# Patient Record
Sex: FEMALE | Race: WHITE | NOT HISPANIC OR LATINO | Employment: OTHER | URBAN - METROPOLITAN AREA
[De-identification: names, ages, dates, MRNs, and addresses within clinical notes are randomized per-mention and may not be internally consistent; named-entity substitution may affect disease eponyms.]

---

## 2022-05-24 ENCOUNTER — TELEPHONE (OUTPATIENT)
Dept: NEPHROLOGY | Facility: CLINIC | Age: 52
End: 2022-05-24

## 2022-05-24 NOTE — TELEPHONE ENCOUNTER
Patient was contacted and was told we unfortunately do not have a provider in the office today. she is having blood in her urine and was advised to contact her urologist and we will see her Wednesday.     ----- Message from Lana Farley sent at 5/24/2022  7:52 AM CDT -----  Regarding: Same Day Appointment  Type:  Same Day Appointment Request        Name of Caller:Patient has appointment scheduled for tomorrow.   Patient states experiencing a problem would like to be seen today If Possible.   Patient states can see any doctor  will give more detail.  When is the first available appointment?  Symptoms:  Best Call Back Number:[phone]  Additional Information:

## 2022-05-25 ENCOUNTER — OFFICE VISIT (OUTPATIENT)
Dept: NEPHROLOGY | Facility: CLINIC | Age: 52
End: 2022-05-25

## 2022-05-25 VITALS
WEIGHT: 239 LBS | OXYGEN SATURATION: 97 % | HEIGHT: 68 IN | HEART RATE: 99 BPM | BODY MASS INDEX: 36.22 KG/M2 | SYSTOLIC BLOOD PRESSURE: 136 MMHG | DIASTOLIC BLOOD PRESSURE: 100 MMHG | TEMPERATURE: 97 F

## 2022-05-25 DIAGNOSIS — N18.31 STAGE 3A CHRONIC KIDNEY DISEASE: Primary | ICD-10-CM

## 2022-05-25 DIAGNOSIS — I10 PRIMARY HYPERTENSION: ICD-10-CM

## 2022-05-25 DIAGNOSIS — Z90.5 H/O RIGHT NEPHRECTOMY: ICD-10-CM

## 2022-05-25 PROCEDURE — 99213 OFFICE O/P EST LOW 20 MIN: CPT | Mod: ,,, | Performed by: INTERNAL MEDICINE

## 2022-05-25 PROCEDURE — 99213 PR OFFICE/OUTPT VISIT, EST, LEVL III, 20-29 MIN: ICD-10-PCS | Mod: ,,, | Performed by: INTERNAL MEDICINE

## 2022-05-25 RX ORDER — LEVOTHYROXINE SODIUM 75 UG/1
75 TABLET ORAL DAILY
COMMUNITY
Start: 2022-04-14

## 2022-05-25 NOTE — PROGRESS NOTES
Nephrology Clinic Note           Pt Name:  Asiya Reed  Pt :  1970  Pt MRN:  [id number]    Date: 2022  Provider: Jerry Alaniz      Chief Complaint:   Chief Complaint   Patient presents with    Chronic Kidney Disease     Serum creatinine 0.98, GFR 60, CKD stage 3       HPI:  Asiya Reed is a 51 y.o. female presenting for chronic kidney disease stage III, status post right kidney nephrectomy.  History is significant for hypertension, diabetes, status post right kidney nephrectomy for hydronephrosis, status post radiation therapy for cervical cancer, urinary incontinence.  The patient is coming here for the 3rd time. She was referred from primary doctor for abnormal renal function.  Most likely she has had abnormal renal function at least -since 2018.   she had a nephrectomy of her right kidney.    Since her last visit - looks her BP with better control, taking amlodipine at present. Dm under control off of medications   Reports good urine output, has urinary incontinence, denies any dysuria or hematuria. Trying to see an urologist but does not have an insuarnce  Today in the office, no shortness of breath, no chest pain, no fever, no dysuria, no hematuria, no swelling of her legs, no diarrhea, or constipation.         Review of Systems   Gen: no fever, chills, fatigue, weight loss/gain  HEENT: no vision changes, no hearing deficits, no nasal discharge  Respiratory: no cough, dyspnea  CVS: no chest pain, PND, orthopnea  Abd: no nausea, vomiting, abdominal pain, diarrhea, constipation  : no hematuria, dysuria, urinary retention  Ext: no edema, joint and muscle pains  Neuro: no weakness, no numbness  Skin: no rashes, no lesions  Psych: no depression, no anxiety    History:   Past Medical History:   Diagnosis Date    Bipolar 1 disorder     Bladder stone     Cervical cancer     Essential (primary) hypertension     Gallstone     History of thyroid disease     Hydronephrosis      "Hypercholesteremia     Iron deficiency anemia, unspecified     Nocturia     Renal disorder     renal stints    Retained ureteral stent     Type 2 diabetes mellitus without complications     Unspecified chronic bronchitis     Unspecified urinary incontinence      Past Surgical History:   Procedure Laterality Date     renal stint       unblock Kidney      ABDOMINAL SURGERY      TOOK OUT REMAINING PIECE OF OVARY AND FALLOPIAN TUBE AND CLEAN OUT INFECTION    CERVIX SURGERY      Twice     SECTION      CHOLECYSTECTOMY  2014    HYSTERECTOMY      NEPHROSTOMY Right     PYELOPLASTY Right 2013     Family History   Problem Relation Age of Onset    Hypertension Mother     Liver disease Mother     Hypertension Father     Heart disease Father     Heart disease Maternal Grandmother      Social History     Substance and Sexual Activity   Alcohol Use Not on file     Social History     Substance and Sexual Activity   Drug Use Never     Social History     Substance and Sexual Activity   Sexual Activity Not on file     has no history on file for sexual activity.  Social History     Tobacco Use   Smoking Status Current Some Day Smoker    Types: Cigarettes   Smokeless Tobacco Never Used       Allergies:  Review of patient's allergies indicates:  No Known Allergies    [unfilled]       Physical Exam  Vitals:   Vitals:    22 1421   BP: (!) 163/101   Pulse: 99   Temp: 97.3 °F (36.3 °C)   SpO2: 97%   Weight: 108.4 kg (239 lb)   Height: 5' 8" (1.727 m)     Body mass index is 36.34 kg/m².    Vital signs:   Wt Readings from Last 3 Encounters:   22 108.4 kg (239 lb)     Temp Readings from Last 3 Encounters:   22 97.3 °F (36.3 °C)     BP Readings from Last 3 Encounters:   22 (!) 163/101     Pulse Readings from Last 3 Encounters:   22 99       @FLOWSTAT(5:24::1)@    GENERAL ASSESSMENT: awake and alert in no acute distress.  Eyes: anicteric sclera, no erythema or discharge.  HENT: " "Normocephalic, atraumatic, moist mucus membranes  Neck: Supple, no thyromegaly or lymphadenopathy   SKIN EXAM: no rash, ecchymosis.  Cardiovascular: regular rate and rhythm, S1 S2 heard.  No murmurs, rubs or gallops.  Respiratory: No rales, no wheezes or rhonchi  GI: BS+, NT, ND, no organomegaly.  : No chaudhari   MSK: No edema.  No joint stiffness, effusions  NEURO: alert and oriented,  PSYCH: answers questions appropriately, organized thinking   Nails: No clubbing, no pallor       Labs/Tests:  @2YRLABRCNT(Na:3,K:3,CL:3,CO2:3,BUN:3,CREATININE:3,GLUCOSE:3,CALCIUM:3,PHOS:3,ALBUMIN:3,ANIONGAP:3,EGFRNONAA:3,EGFRAA:3,HGB:3,HGBA1C:3,TRANSFERRIN:3,IRON:3,TIBC:3,LABIRON:3,FERRITIN:3,TRIG:3,CHOL:3,HDL:3,LDLCALC:3,LABVLDL:3,NHDLCHOL:3)@   @2YRLABRCNT(pthi:*,paxasknk94cw:*)@  @2YRLABRCNT(PROCREA:3,PROTUR:3,CREATININEUR:3)@      @2YRLABRCNT(COLORUR:*,CLARITYUR:*,SPECGRAVUR:*,PHUR:*,PROTUR:*,BILIRUBINUR:*,BLOODUR:*,KETONESUR:*,UROB:*,NITRITEUR:*,LEUKOCYTESUR:*,HYCST:*,WBCUAMN:*,RBCUAMN:*,EPIUAMN:*,BACTM:*)@    "@2YRLABRCNT(URICACID:*)@    Renal US/ Ct scan surgically removed right kidney, left kidney no abnormalities:    Assessment & Plan:      Visit Diagnosis  1. Stage 3a chronic kidney disease    2. H/O right nephrectomy    3. Primary hypertension          Plan    1. Status post right kidney nephrectomy for hydronephrosis 2019 , was seeing dr. Harmony MD but no  at present, no insurance.  before that hydronephrosis attributed to cervical cancer and radiation therapy.  2. High blood pressure-currently well control, continue with  amlodipine   3. CKD stage III creatinine currently around 1 isk factors associated with kidney disease and its progression discussed with the patient.  Follow-up as needed, discharge for now from clinic  4. Diabetes type 2-well control at present.  5. Hepatitis C - reports finished the treatment for that.          Orders this visit   No orders of the defined types were placed in this encounter.   "       Follow Up:   No follow-ups on file.    Jerry Alaniz M.D.  Nephrology  05/25/2022  2:20 PM

## 2022-09-06 ENCOUNTER — TELEPHONE (OUTPATIENT)
Dept: NEPHROLOGY | Facility: CLINIC | Age: 52
End: 2022-09-06

## 2022-09-06 NOTE — TELEPHONE ENCOUNTER
Per Luna and Dr. Alaniz patient was contacted and notified that she does not need an appt at this time.